# Patient Record
Sex: MALE | Race: WHITE | NOT HISPANIC OR LATINO | Employment: UNEMPLOYED | URBAN - METROPOLITAN AREA
[De-identification: names, ages, dates, MRNs, and addresses within clinical notes are randomized per-mention and may not be internally consistent; named-entity substitution may affect disease eponyms.]

---

## 2023-10-28 ENCOUNTER — HOSPITAL ENCOUNTER (EMERGENCY)
Facility: HOSPITAL | Age: 43
Discharge: HOME | End: 2023-10-29
Attending: INTERNAL MEDICINE
Payer: COMMERCIAL

## 2023-10-28 DIAGNOSIS — R45.1 AGITATION: ICD-10-CM

## 2023-10-28 DIAGNOSIS — F23 ACUTE PSYCHOSIS: ICD-10-CM

## 2023-10-28 DIAGNOSIS — F19.921: Primary | ICD-10-CM

## 2023-10-28 LAB
ALBUMIN SERPL-MCNC: 3.6 G/DL (ref 3.5–5)
ALBUMIN/GLOB SERPL: 1.2 RATIO (ref 1.1–2)
ALP SERPL-CCNC: 58 UNIT/L (ref 40–150)
ALT SERPL-CCNC: 14 UNIT/L (ref 0–55)
APAP SERPL-MCNC: <17.4 UG/ML (ref 17.4–30)
AST SERPL-CCNC: 21 UNIT/L (ref 5–34)
BASOPHILS # BLD AUTO: 0.01 X10(3)/MCL
BASOPHILS NFR BLD AUTO: 0.1 %
BILIRUB SERPL-MCNC: 0.2 MG/DL
BUN SERPL-MCNC: 15 MG/DL (ref 8.9–20.6)
CALCIUM SERPL-MCNC: 8.6 MG/DL (ref 8.4–10.2)
CHLORIDE SERPL-SCNC: 108 MMOL/L (ref 98–107)
CO2 SERPL-SCNC: 21 MMOL/L (ref 22–29)
CREAT SERPL-MCNC: 1.33 MG/DL (ref 0.73–1.18)
EOSINOPHIL # BLD AUTO: 0.17 X10(3)/MCL (ref 0–0.9)
EOSINOPHIL NFR BLD AUTO: 2.1 %
ERYTHROCYTE [DISTWIDTH] IN BLOOD BY AUTOMATED COUNT: 12.2 % (ref 11.5–17)
ETHANOL SERPL-MCNC: <10 MG/DL
GFR SERPLBLD CREATININE-BSD FMLA CKD-EPI: >60 MLS/MIN/1.73/M2
GLOBULIN SER-MCNC: 3 GM/DL (ref 2.4–3.5)
GLUCOSE SERPL-MCNC: 107 MG/DL (ref 74–100)
HCT VFR BLD AUTO: 43.2 % (ref 42–52)
HGB BLD-MCNC: 14.9 G/DL (ref 14–18)
IMM GRANULOCYTES # BLD AUTO: 0.02 X10(3)/MCL (ref 0–0.04)
IMM GRANULOCYTES NFR BLD AUTO: 0.2 %
LYMPHOCYTES # BLD AUTO: 1.74 X10(3)/MCL (ref 0.6–4.6)
LYMPHOCYTES NFR BLD AUTO: 21.7 %
MCH RBC QN AUTO: 32.9 PG (ref 27–31)
MCHC RBC AUTO-ENTMCNC: 34.5 G/DL (ref 33–36)
MCV RBC AUTO: 95.4 FL (ref 80–94)
MONOCYTES # BLD AUTO: 0.57 X10(3)/MCL (ref 0.1–1.3)
MONOCYTES NFR BLD AUTO: 7.1 %
NEUTROPHILS # BLD AUTO: 5.52 X10(3)/MCL (ref 2.1–9.2)
NEUTROPHILS NFR BLD AUTO: 68.8 %
PLATELET # BLD AUTO: 194 X10(3)/MCL (ref 130–400)
PMV BLD AUTO: 10.1 FL (ref 7.4–10.4)
POTASSIUM SERPL-SCNC: 3.9 MMOL/L (ref 3.5–5.1)
PROT SERPL-MCNC: 6.6 GM/DL (ref 6.4–8.3)
RBC # BLD AUTO: 4.53 X10(6)/MCL (ref 4.7–6.1)
SALICYLATES SERPL-MCNC: <5 MG/DL (ref 15–30)
SODIUM SERPL-SCNC: 142 MMOL/L (ref 136–145)
TSH SERPL-ACNC: 1.16 UIU/ML (ref 0.35–4.94)
WBC # SPEC AUTO: 8.03 X10(3)/MCL (ref 4.5–11.5)

## 2023-10-28 PROCEDURE — 85025 COMPLETE CBC W/AUTO DIFF WBC: CPT | Performed by: INTERNAL MEDICINE

## 2023-10-28 PROCEDURE — 96360 HYDRATION IV INFUSION INIT: CPT

## 2023-10-28 PROCEDURE — 84443 ASSAY THYROID STIM HORMONE: CPT | Performed by: INTERNAL MEDICINE

## 2023-10-28 PROCEDURE — 82077 ASSAY SPEC XCP UR&BREATH IA: CPT | Performed by: INTERNAL MEDICINE

## 2023-10-28 PROCEDURE — 25000003 PHARM REV CODE 250: Performed by: INTERNAL MEDICINE

## 2023-10-28 PROCEDURE — 99285 EMERGENCY DEPT VISIT HI MDM: CPT

## 2023-10-28 PROCEDURE — 80053 COMPREHEN METABOLIC PANEL: CPT | Performed by: INTERNAL MEDICINE

## 2023-10-28 PROCEDURE — 80143 DRUG ASSAY ACETAMINOPHEN: CPT | Performed by: INTERNAL MEDICINE

## 2023-10-28 PROCEDURE — 63600175 PHARM REV CODE 636 W HCPCS: Performed by: INTERNAL MEDICINE

## 2023-10-28 PROCEDURE — 96361 HYDRATE IV INFUSION ADD-ON: CPT

## 2023-10-28 PROCEDURE — 96372 THER/PROPH/DIAG INJ SC/IM: CPT | Performed by: INTERNAL MEDICINE

## 2023-10-28 PROCEDURE — 80179 DRUG ASSAY SALICYLATE: CPT | Performed by: INTERNAL MEDICINE

## 2023-10-28 RX ORDER — DIPHENHYDRAMINE HYDROCHLORIDE 50 MG/ML
50 INJECTION INTRAMUSCULAR; INTRAVENOUS
Status: COMPLETED | OUTPATIENT
Start: 2023-10-28 | End: 2023-10-28

## 2023-10-28 RX ORDER — LORAZEPAM 2 MG/ML
2 INJECTION INTRAMUSCULAR
Status: COMPLETED | OUTPATIENT
Start: 2023-10-28 | End: 2023-10-28

## 2023-10-28 RX ORDER — HALOPERIDOL 5 MG/ML
10 INJECTION INTRAMUSCULAR
Status: COMPLETED | OUTPATIENT
Start: 2023-10-28 | End: 2023-10-28

## 2023-10-28 RX ORDER — SODIUM CHLORIDE 9 MG/ML
1000 INJECTION, SOLUTION INTRAVENOUS CONTINUOUS
Status: ACTIVE | OUTPATIENT
Start: 2023-10-28 | End: 2023-10-28

## 2023-10-28 RX ADMIN — DIPHENHYDRAMINE HYDROCHLORIDE 50 MG: 50 INJECTION INTRAMUSCULAR; INTRAVENOUS at 08:10

## 2023-10-28 RX ADMIN — LORAZEPAM 2 MG: 2 INJECTION INTRAMUSCULAR; INTRAVENOUS at 08:10

## 2023-10-28 RX ADMIN — HALOPERIDOL LACTATE 10 MG: 5 INJECTION, SOLUTION INTRAMUSCULAR at 08:10

## 2023-10-28 RX ADMIN — SODIUM CHLORIDE 1000 ML: 9 INJECTION, SOLUTION INTRAVENOUS at 10:10

## 2023-10-28 RX ADMIN — SODIUM CHLORIDE 1000 ML: 9 INJECTION, SOLUTION INTRAVENOUS at 08:10

## 2023-10-29 VITALS
RESPIRATION RATE: 18 BRPM | SYSTOLIC BLOOD PRESSURE: 101 MMHG | DIASTOLIC BLOOD PRESSURE: 61 MMHG | OXYGEN SATURATION: 95 % | TEMPERATURE: 98 F | WEIGHT: 240 LBS | HEART RATE: 59 BPM

## 2023-10-29 PROCEDURE — 25000003 PHARM REV CODE 250: Performed by: INTERNAL MEDICINE

## 2023-10-29 RX ORDER — SODIUM CHLORIDE 9 MG/ML
1000 INJECTION, SOLUTION INTRAVENOUS CONTINUOUS
Status: ACTIVE | OUTPATIENT
Start: 2023-10-28 | End: 2023-10-29

## 2023-10-29 RX ADMIN — SODIUM CHLORIDE 1000 ML: 9 INJECTION, SOLUTION INTRAVENOUS at 12:10

## 2023-10-29 NOTE — ED PROVIDER NOTES
Encounter Date: 10/28/2023       History     Chief Complaint   Patient presents with    Psychiatric Evaluation     Per ems pt found on side of road with bizarre behavior. 400mcg ketamine given in route.      42-year-old white male brought in by EMS after being found on the side of road with bizarre behavior and being very combative.  He was given 400 mg of ketamine intramuscularly by EMS and despite this they still had to time down and he was screaming profanity.  Per EMS patient has a known history of synthetic marijuana use       Review of patient's allergies indicates:  No Known Allergies  No past medical history on file.  No past surgical history on file.  No family history on file.     Review of Systems   Unable to perform ROS: Mental status change       Physical Exam     Initial Vitals [10/28/23 2019]   BP Pulse Resp Temp SpO2   135/68 (!) 119 (!) 26 98.2 °F (36.8 °C) (!) 94 %      MAP       --         Physical Exam    Nursing note and vitals reviewed.  Constitutional: He appears well-developed and well-nourished. He is diaphoretic. He appears distressed.   Disheveled, dirty   HENT:   Head: Normocephalic and atraumatic.   Eyes: Conjunctivae and EOM are normal.   Neck: Neck supple.   Normal range of motion.  Cardiovascular:  Regular rhythm.           Tachycardia   Pulmonary/Chest: Breath sounds normal.   Tachypnea   Abdominal: Abdomen is soft. Bowel sounds are normal.   Musculoskeletal:         General: Normal range of motion.      Cervical back: Normal range of motion and neck supple.     Neurological: He is alert.   Skin: Skin is warm. Capillary refill takes less than 2 seconds.   Psychiatric: His mood appears anxious. His affect is angry and inappropriate. His speech is rapid and/or pressured and tangential. He is agitated, aggressive, hyperactive, actively hallucinating and combative. Thought content is paranoid and delusional. He expresses impulsivity. He is inattentive.         ED Course   Critical  Care    Date/Time: 10/29/2023 2:50 AM    Performed by: Farhat Paredes MD  Authorized by: Farhat Paredes MD  Direct patient critical care time: 36 minutes  Additional history critical care time: 12 minutes  Ordering / reviewing critical care time: 11 minutes  Documentation critical care time: 13 minutes  Consult with family critical care time: 7 minutes  Total critical care time (exclusive of procedural time) : 79 minutes  Critical care time was exclusive of separately billable procedures and treating other patients.  Critical care was necessary to treat or prevent imminent or life-threatening deterioration of the following conditions: toxidrome and CNS failure or compromise.  Critical care was time spent personally by me on the following activities: blood draw for specimens, development of treatment plan with patient or surrogate, interpretation of cardiac output measurements, evaluation of patient's response to treatment, examination of patient, obtaining history from patient or surrogate, ordering and performing treatments and interventions, ordering and review of laboratory studies, pulse oximetry, re-evaluation of patient's condition, review of old charts and vascular access procedures.        Admission on 10/28/2023   Component Date Value Ref Range Status    Sodium Level 10/28/2023 142  136 - 145 mmol/L Final    Potassium Level 10/28/2023 3.9  3.5 - 5.1 mmol/L Final    Chloride 10/28/2023 108 (H)  98 - 107 mmol/L Final    Carbon Dioxide 10/28/2023 21 (L)  22 - 29 mmol/L Final    Glucose Level 10/28/2023 107 (H)  74 - 100 mg/dL Final    Blood Urea Nitrogen 10/28/2023 15.0  8.9 - 20.6 mg/dL Final    Creatinine 10/28/2023 1.33 (H)  0.73 - 1.18 mg/dL Final    Calcium Level Total 10/28/2023 8.6  8.4 - 10.2 mg/dL Final    Protein Total 10/28/2023 6.6  6.4 - 8.3 gm/dL Final    Albumin Level 10/28/2023 3.6  3.5 - 5.0 g/dL Final    Globulin 10/28/2023 3.0  2.4 - 3.5 gm/dL Final    Albumin/Globulin Ratio  10/28/2023 1.2  1.1 - 2.0 ratio Final    Bilirubin Total 10/28/2023 0.2  <=1.5 mg/dL Final    Alkaline Phosphatase 10/28/2023 58  40 - 150 unit/L Final    Alanine Aminotransferase 10/28/2023 14  0 - 55 unit/L Final    Aspartate Aminotransferase 10/28/2023 21  5 - 34 unit/L Final    eGFR 10/28/2023 >60  mls/min/1.73/m2 Final    TSH 10/28/2023 1.157  0.350 - 4.940 uIU/mL Final    Ethanol Level 10/28/2023 <10.0  <=10.0 mg/dL Final    Acetaminophen Level 10/28/2023 <17.4 (L)  17.4 - 30.0 ug/ml Final    Salicylate Level 10/28/2023 <5.0 (L)  15.0 - 30.0 mg/dL Final    WBC 10/28/2023 8.03  4.50 - 11.50 x10(3)/mcL Final    RBC 10/28/2023 4.53 (L)  4.70 - 6.10 x10(6)/mcL Final    Hgb 10/28/2023 14.9  14.0 - 18.0 g/dL Final    Hct 10/28/2023 43.2  42.0 - 52.0 % Final    MCV 10/28/2023 95.4 (H)  80.0 - 94.0 fL Final    MCH 10/28/2023 32.9 (H)  27.0 - 31.0 pg Final    MCHC 10/28/2023 34.5  33.0 - 36.0 g/dL Final    RDW 10/28/2023 12.2  11.5 - 17.0 % Final    Platelet 10/28/2023 194  130 - 400 x10(3)/mcL Final    MPV 10/28/2023 10.1  7.4 - 10.4 fL Final    Neut % 10/28/2023 68.8  % Final    Lymph % 10/28/2023 21.7  % Final    Mono % 10/28/2023 7.1  % Final    Eos % 10/28/2023 2.1  % Final    Basophil % 10/28/2023 0.1  % Final    Lymph # 10/28/2023 1.74  0.6 - 4.6 x10(3)/mcL Final    Neut # 10/28/2023 5.52  2.1 - 9.2 x10(3)/mcL Final    Mono # 10/28/2023 0.57  0.1 - 1.3 x10(3)/mcL Final    Eos # 10/28/2023 0.17  0 - 0.9 x10(3)/mcL Final    Baso # 10/28/2023 0.01  <=0.2 x10(3)/mcL Final    IG# 10/28/2023 0.02  0 - 0.04 x10(3)/mcL Final    IG% 10/28/2023 0.2  % Final       Labs Reviewed   COMPREHENSIVE METABOLIC PANEL - Abnormal; Notable for the following components:       Result Value    Chloride 108 (*)     Carbon Dioxide 21 (*)     Glucose Level 107 (*)     Creatinine 1.33 (*)     All other components within normal limits   ACETAMINOPHEN LEVEL - Abnormal; Notable for the following components:    Acetaminophen Level <17.4 (*)      All other components within normal limits   SALICYLATE LEVEL - Abnormal; Notable for the following components:    Salicylate Level <5.0 (*)     All other components within normal limits   CBC WITH DIFFERENTIAL - Abnormal; Notable for the following components:    RBC 4.53 (*)     MCV 95.4 (*)     MCH 32.9 (*)     All other components within normal limits   TSH - Normal   ALCOHOL,MEDICAL (ETHANOL) - Normal   CBC W/ AUTO DIFFERENTIAL    Narrative:     The following orders were created for panel order CBC auto differential.  Procedure                               Abnormality         Status                     ---------                               -----------         ------                     CBC with Differential[[id number]]       Abnormal            Final result                 Please view results for these tests on the individual orders.   URINALYSIS, REFLEX TO URINE CULTURE   DRUG SCREEN, URINE (BEAKER)          Imaging Results    None          Medications   0.9%  NaCl infusion (0 mLs Intravenous Stopped 10/28/23 2230)   0.9%  NaCl infusion (1,000 mLs Intravenous New Bag 10/29/23 0025)   diphenhydrAMINE injection 50 mg (50 mg Intramuscular Given by Provider 10/28/23 2049)   haloperidol lactate injection 10 mg (10 mg Intramuscular Given by Provider 10/28/23 2050)   LORazepam injection 2 mg (2 mg Intramuscular Given by Provider 10/28/23 2051)     Medical Decision Making  42-year-old white male with acute psychosis and bizarre behavior.  Patient has a known history of synthetic marijuana use and was found the side road being combative with bizarre behavior.  EMS gave 400 mg of ketamine intramuscularly and brought into the emergency department for evaluation.  Once in the room patient continued lash out and became a danger to himself and the nursing staff therefore he was given a be 52 at that time and placed in stuff restraints.  Within 30 minutes the combination of the ketamine in the be 52 dramatically calm him  down in an IV was able to be started and at that time he was given 500 mL of saline Q hour to try to help silver him up.  Blood work was drawn and urinalysis was ordered however he never provide urine while he was here.  Blood work drawn showed no acute changes so we just continued with IV fluids.  After the 2 L of fluid his heart rate came down to the 60s in his blood pressure was down in the 1 100s over 70s.  He slept for approximately 6 hours and woke up wanting to know what happened was asked him to call his brother to come pick him up so he could go home.  He is awake alert and oriented denies suicidal homicidal ideation states he wants to go home    Problems Addressed:  Acute psychosis: acute illness or injury with systemic symptoms that poses a threat to life or bodily functions  Agitation: acute illness or injury  Synthetic cannabinoid-induced delirium: acute illness or injury with systemic symptoms that poses a threat to life or bodily functions    Amount and/or Complexity of Data Reviewed  External Data Reviewed: labs and notes.  Labs: ordered. Decision-making details documented in ED Course.    Risk  OTC drugs.  Prescription drug management.  Drug therapy requiring intensive monitoring for toxicity.  Diagnosis or treatment significantly limited by social determinants of health.               ED Course as of 10/29/23 0251   Sat Oct 28, 2023   2038 Despite patient being in 4 point soft restraints and getting 400 mg of ketamine intramuscular by EMS he is still thrashing around in the bed and is breaking through the restraints therefore I have ordered Benadryl Haldol and Ativan to be given intramuscularly and we will reapply the better restraints [PL]      ED Course User Index  [PL] Farhat Paredes MD                    Clinical Impression:   Final diagnoses:  [R45.1] Agitation  [F19.921] Synthetic cannabinoid-induced delirium (Primary)  [F23] Acute psychosis        ED Disposition Condition    Discharge  Stable          ED Prescriptions    None       Follow-up Information       Follow up With Specialties Details Why Contact Info    Primary care physician  In 3 days            Maria Luz Duron is a certified MA and was present during the entire interaction with this patient      Farhat Paredes MD  10/29/23 4909

## 2023-12-17 ENCOUNTER — HOSPITAL ENCOUNTER (EMERGENCY)
Facility: HOSPITAL | Age: 43
Discharge: PSYCHIATRIC OR SUBSTANCE USE FACILITY | End: 2023-12-18
Attending: GENERAL ACUTE CARE HOSPITAL
Payer: MEDICAID

## 2023-12-17 DIAGNOSIS — F29 PSYCHOSIS, UNSPECIFIED PSYCHOSIS TYPE: Primary | ICD-10-CM

## 2023-12-17 DIAGNOSIS — T40.725A: ICD-10-CM

## 2023-12-17 DIAGNOSIS — J11.1 INFLUENZA: ICD-10-CM

## 2023-12-17 DIAGNOSIS — J10.1 INFLUENZA A: ICD-10-CM

## 2023-12-17 LAB
ALBUMIN SERPL-MCNC: 3.8 G/DL (ref 3.5–5)
ALBUMIN/GLOB SERPL: 1.1 RATIO (ref 1.1–2)
ALP SERPL-CCNC: 57 UNIT/L (ref 40–150)
ALT SERPL-CCNC: 16 UNIT/L (ref 0–55)
AMPHET UR QL SCN: NEGATIVE
APAP SERPL-MCNC: <17.4 UG/ML (ref 17.4–30)
APPEARANCE UR: CLEAR
AST SERPL-CCNC: 20 UNIT/L (ref 5–34)
BACTERIA #/AREA URNS AUTO: ABNORMAL /HPF
BARBITURATE SCN PRESENT UR: NEGATIVE
BASOPHILS # BLD AUTO: 0.01 X10(3)/MCL
BASOPHILS NFR BLD AUTO: 0.2 %
BENZODIAZ UR QL SCN: NEGATIVE
BILIRUB SERPL-MCNC: 0.4 MG/DL
BILIRUB UR QL STRIP.AUTO: ABNORMAL
BUN SERPL-MCNC: 17 MG/DL (ref 8.9–20.6)
CALCIUM SERPL-MCNC: 9.2 MG/DL (ref 8.4–10.2)
CANNABINOIDS UR QL SCN: POSITIVE
CHLORIDE SERPL-SCNC: 104 MMOL/L (ref 98–107)
CO2 SERPL-SCNC: 24 MMOL/L (ref 22–29)
COCAINE UR QL SCN: NEGATIVE
COLOR UR AUTO: YELLOW
CREAT SERPL-MCNC: 1.25 MG/DL (ref 0.73–1.18)
EOSINOPHIL # BLD AUTO: 0.2 X10(3)/MCL (ref 0–0.9)
EOSINOPHIL NFR BLD AUTO: 3.2 %
ERYTHROCYTE [DISTWIDTH] IN BLOOD BY AUTOMATED COUNT: 11.7 % (ref 11.5–17)
ETHANOL SERPL-MCNC: <10 MG/DL
FENTANYL UR QL SCN: NEGATIVE
FLUAV AG UPPER RESP QL IA.RAPID: DETECTED
FLUBV AG UPPER RESP QL IA.RAPID: NOT DETECTED
GFR SERPLBLD CREATININE-BSD FMLA CKD-EPI: >60 MLS/MIN/1.73/M2
GLOBULIN SER-MCNC: 3.6 GM/DL (ref 2.4–3.5)
GLUCOSE SERPL-MCNC: 123 MG/DL (ref 74–100)
GLUCOSE UR QL STRIP.AUTO: NEGATIVE
HCT VFR BLD AUTO: 46.1 % (ref 42–52)
HGB BLD-MCNC: 15.8 G/DL (ref 14–18)
IMM GRANULOCYTES # BLD AUTO: 0.01 X10(3)/MCL (ref 0–0.04)
IMM GRANULOCYTES NFR BLD AUTO: 0.2 %
KETONES UR QL STRIP.AUTO: ABNORMAL
LEUKOCYTE ESTERASE UR QL STRIP.AUTO: NEGATIVE
LYMPHOCYTES # BLD AUTO: 2.62 X10(3)/MCL (ref 0.6–4.6)
LYMPHOCYTES NFR BLD AUTO: 41.5 %
MCH RBC QN AUTO: 32 PG (ref 27–31)
MCHC RBC AUTO-ENTMCNC: 34.3 G/DL (ref 33–36)
MCV RBC AUTO: 93.5 FL (ref 80–94)
MDMA UR QL SCN: NEGATIVE
MONOCYTES # BLD AUTO: 0.27 X10(3)/MCL (ref 0.1–1.3)
MONOCYTES NFR BLD AUTO: 4.3 %
MUCOUS THREADS URNS QL MICRO: ABNORMAL /LPF
NEUTROPHILS # BLD AUTO: 3.2 X10(3)/MCL (ref 2.1–9.2)
NEUTROPHILS NFR BLD AUTO: 50.6 %
NITRITE UR QL STRIP.AUTO: NEGATIVE
OPIATES UR QL SCN: NEGATIVE
PCP UR QL: NEGATIVE
PH UR STRIP.AUTO: 6 [PH]
PH UR: 6 [PH] (ref 3–11)
PLATELET # BLD AUTO: 206 X10(3)/MCL (ref 130–400)
PMV BLD AUTO: 10.1 FL (ref 7.4–10.4)
POTASSIUM SERPL-SCNC: 4.1 MMOL/L (ref 3.5–5.1)
PROT SERPL-MCNC: 7.4 GM/DL (ref 6.4–8.3)
PROT UR QL STRIP.AUTO: ABNORMAL
RBC # BLD AUTO: 4.93 X10(6)/MCL (ref 4.7–6.1)
RBC #/AREA URNS AUTO: ABNORMAL /HPF
RBC UR QL AUTO: NEGATIVE
RSV A 5' UTR RNA NPH QL NAA+PROBE: NOT DETECTED
SALICYLATES SERPL-MCNC: <5 MG/DL (ref 15–30)
SARS-COV-2 RNA RESP QL NAA+PROBE: NOT DETECTED
SODIUM SERPL-SCNC: 140 MMOL/L (ref 136–145)
SP GR UR STRIP.AUTO: 1.02 (ref 1–1.03)
SPECIFIC GRAVITY, URINE AUTO (.000) (OHS): 1.02 (ref 1–1.03)
SQUAMOUS #/AREA URNS AUTO: ABNORMAL /HPF
TSH SERPL-ACNC: 1.08 UIU/ML (ref 0.35–4.94)
UROBILINOGEN UR STRIP-ACNC: 0.2
WBC # SPEC AUTO: 6.31 X10(3)/MCL (ref 4.5–11.5)
WBC #/AREA URNS AUTO: ABNORMAL /HPF

## 2023-12-17 PROCEDURE — 99285 EMERGENCY DEPT VISIT HI MDM: CPT

## 2023-12-17 PROCEDURE — 80307 DRUG TEST PRSMV CHEM ANLYZR: CPT | Performed by: GENERAL ACUTE CARE HOSPITAL

## 2023-12-17 PROCEDURE — 82077 ASSAY SPEC XCP UR&BREATH IA: CPT | Performed by: GENERAL ACUTE CARE HOSPITAL

## 2023-12-17 PROCEDURE — 80053 COMPREHEN METABOLIC PANEL: CPT | Performed by: GENERAL ACUTE CARE HOSPITAL

## 2023-12-17 PROCEDURE — 84443 ASSAY THYROID STIM HORMONE: CPT | Performed by: GENERAL ACUTE CARE HOSPITAL

## 2023-12-17 PROCEDURE — 0241U COVID/RSV/FLU A&B PCR: CPT | Performed by: GENERAL ACUTE CARE HOSPITAL

## 2023-12-17 PROCEDURE — 81001 URINALYSIS AUTO W/SCOPE: CPT | Performed by: GENERAL ACUTE CARE HOSPITAL

## 2023-12-17 PROCEDURE — 96372 THER/PROPH/DIAG INJ SC/IM: CPT | Performed by: GENERAL ACUTE CARE HOSPITAL

## 2023-12-17 PROCEDURE — 87428 SARSCOV & INF VIR A&B AG IA: CPT | Performed by: GENERAL ACUTE CARE HOSPITAL

## 2023-12-17 PROCEDURE — 80179 DRUG ASSAY SALICYLATE: CPT | Performed by: GENERAL ACUTE CARE HOSPITAL

## 2023-12-17 PROCEDURE — 80143 DRUG ASSAY ACETAMINOPHEN: CPT | Performed by: GENERAL ACUTE CARE HOSPITAL

## 2023-12-17 PROCEDURE — 63600175 PHARM REV CODE 636 W HCPCS: Performed by: GENERAL ACUTE CARE HOSPITAL

## 2023-12-17 PROCEDURE — 85025 COMPLETE CBC W/AUTO DIFF WBC: CPT | Performed by: GENERAL ACUTE CARE HOSPITAL

## 2023-12-17 RX ORDER — DIPHENHYDRAMINE HYDROCHLORIDE 50 MG/ML
25 INJECTION INTRAMUSCULAR; INTRAVENOUS
Status: COMPLETED | OUTPATIENT
Start: 2023-12-17 | End: 2023-12-17

## 2023-12-17 RX ORDER — HALOPERIDOL 5 MG/ML
5 INJECTION INTRAMUSCULAR
Status: COMPLETED | OUTPATIENT
Start: 2023-12-17 | End: 2023-12-17

## 2023-12-17 RX ORDER — LORAZEPAM 2 MG/ML
2 INJECTION INTRAMUSCULAR
Status: COMPLETED | OUTPATIENT
Start: 2023-12-17 | End: 2023-12-17

## 2023-12-17 RX ADMIN — DIPHENHYDRAMINE HYDROCHLORIDE 25 MG: 50 INJECTION, SOLUTION INTRAMUSCULAR; INTRAVENOUS at 06:12

## 2023-12-17 RX ADMIN — HALOPERIDOL LACTATE 5 MG: 5 INJECTION, SOLUTION INTRAMUSCULAR at 06:12

## 2023-12-17 RX ADMIN — LORAZEPAM 2 MG: 2 INJECTION, SOLUTION INTRAMUSCULAR; INTRAVENOUS at 06:12

## 2023-12-18 VITALS
TEMPERATURE: 97 F | WEIGHT: 225 LBS | OXYGEN SATURATION: 99 % | BODY MASS INDEX: 33.33 KG/M2 | HEIGHT: 69 IN | HEART RATE: 60 BPM | SYSTOLIC BLOOD PRESSURE: 109 MMHG | RESPIRATION RATE: 18 BRPM | DIASTOLIC BLOOD PRESSURE: 66 MMHG

## 2023-12-18 PROCEDURE — 63600175 PHARM REV CODE 636 W HCPCS

## 2023-12-18 RX ORDER — ZIPRASIDONE MESYLATE 20 MG/ML
INJECTION, POWDER, LYOPHILIZED, FOR SOLUTION INTRAMUSCULAR
Status: COMPLETED
Start: 2023-12-18 | End: 2023-12-18

## 2023-12-18 RX ORDER — ZIPRASIDONE MESYLATE 20 MG/ML
20 INJECTION, POWDER, LYOPHILIZED, FOR SOLUTION INTRAMUSCULAR
Status: DISCONTINUED | OUTPATIENT
Start: 2023-12-18 | End: 2023-12-18 | Stop reason: HOSPADM

## 2023-12-18 RX ADMIN — ZIPRASIDONE MESYLATE 20 MG: 20 INJECTION, POWDER, LYOPHILIZED, FOR SOLUTION INTRAMUSCULAR at 07:12

## 2023-12-18 NOTE — ED PROVIDER NOTES
Encounter Date: 12/17/2023       History     Chief Complaint   Patient presents with    Altered Mental Status     BIBA found unresponsive in the road by police   EMS reports large pupils and decreased respirations   Pt arrives awake angry and fully alert    shouting nonsense angrilly   EMS gave O2 only     The patient was brought by EMS because he was found unresponsive on road side, he is known to be homeless, drug abuser, has h/o bipolar disorder, by EMS report a police found him on a road side and planned to give nasal narcan, when EMS truck pulled in and after paramedic evaluation he found to have BL pupils dilatation, no narcan was give, he got alert, vocal on a process of transferring him on a EMS stretcher, he is GCS 15 on ER arrival    The history is provided by the EMS personnel and the police. The history is limited by the condition of the patient.     Review of patient's allergies indicates:  No Known Allergies  No past medical history on file.  No past surgical history on file.  No family history on file.  Social History     Tobacco Use    Smoking status: Unknown   Substance Use Topics    Alcohol use: Yes    Drug use: Yes     Review of Systems   Unable to perform ROS: Acuity of condition   Psychiatric/Behavioral:  Positive for agitation, behavioral problems, confusion and decreased concentration. The patient is nervous/anxious and is hyperactive.        Physical Exam     Initial Vitals [12/17/23 1832]   BP Pulse Resp Temp SpO2   (!) 150/82 (!) 114 18 -- 100 %      MAP       --         Physical Exam    Nursing note and vitals reviewed.  Constitutional: He appears well-developed and well-nourished.   Disheveled, dirty   HENT:   Right Ear: External ear normal.   Left Ear: External ear normal.   Eyes: Conjunctivae are normal. Pupils are equal, round, and reactive to light.   Neck:   Normal range of motion.  Cardiovascular:    Tachycardia present.         Pulmonary/Chest: Breath sounds normal.   Abdominal:  Abdomen is soft.   Musculoskeletal:         General: Normal range of motion.      Cervical back: Normal range of motion.     Neurological: He is alert and oriented to person, place, and time.   Skin: Skin is warm. Capillary refill takes 2 to 3 seconds.   Psychiatric: His mood appears anxious. His speech is rapid and/or pressured. He is agitated, aggressive and hyperactive. Thought content is delusional. Cognition and memory are impaired. He expresses inappropriate judgment.         ED Course   Critical Care    Date/Time: 12/17/2023 9:29 PM    Performed by: Mira Chaudhry MD  Authorized by: Mira Chaudhry MD  Direct patient critical care time: 10 minutes  Additional history critical care time: 10 minutes  Documentation critical care time: 10 minutes  Total critical care time (exclusive of procedural time) : 30 minutes  Critical care was time spent personally by me on the following activities: ordering and review of laboratory studies and pulse oximetry.  Comments: Patient requires chemical restraint        Labs Reviewed   COMPREHENSIVE METABOLIC PANEL - Abnormal; Notable for the following components:       Result Value    Glucose Level 123 (*)     Creatinine 1.25 (*)     Globulin 3.6 (*)     All other components within normal limits   URINALYSIS, REFLEX TO URINE CULTURE - Abnormal; Notable for the following components:    Protein, UA 1+ (*)     Ketones, UA Trace (*)     Bilirubin, UA 1+ (*)     All other components within normal limits   DRUG SCREEN, URINE (BEAKER) - Abnormal; Notable for the following components:    Cannabinoids, Urine Positive (*)     All other components within normal limits    Narrative:     Cut off concentrations:    Amphetamines - 1000 ng/ml  Barbiturates - 200 ng/ml  Benzodiazepine - 200 ng/ml  Cannabinoids (THC) - 50 ng/ml  Cocaine - 300 ng/ml  Fentanyl - 1.0 ng/ml  MDMA - 500 ng/ml  Opiates - 300 ng/ml   Phencyclidine (PCP) - 25 ng/ml    Specimen submitted for drug analysis and  tested for pH and specific gravity in order to evaluate sample integrity. Suspect tampering if specific gravity is <1.003 and/or pH is not within the range of 4.5 - 8.0  False negatives may result form substances such as bleach added to urine.  False positives may result for the presence of a substance with similar chemical structure to the drug or its metabolite.    This test provides only a PRELIMINARY analytical test result. A more specific alternate chemical method must be used in order to obtain a confirmed analytical result. Gas chromatography/mass spectrometry (GC/MS) is the preferred confirmatory method. Other chemical confirmation methods are available. Clinical consideration and professional judgement should be applied to any drug of abuse test result, particularly when preliminary positive results are used.    Positive results will be confirmed only at the physicians request. Unconfirmed screening results are to be used only for medical purposes (treatment).        ACETAMINOPHEN LEVEL - Abnormal; Notable for the following components:    Acetaminophen Level <17.4 (*)     All other components within normal limits   SALICYLATE LEVEL - Abnormal; Notable for the following components:    Salicylate Level <5.0 (*)     All other components within normal limits   CBC WITH DIFFERENTIAL - Abnormal; Notable for the following components:    MCH 32.0 (*)     All other components within normal limits   COVID/RSV/FLU A&B PCR - Abnormal; Notable for the following components:    Influenza A PCR Detected (*)     All other components within normal limits    Narrative:     The Xpert Xpress SARS-CoV-2/FLU/RSV plus is a rapid, multiplexed real-time PCR test intended for the simultaneous qualitative detection and differentiation of SARS-CoV-2, Influenza A, Influenza B, and respiratory syncytial virus (RSV) viral RNA in either nasopharyngeal swab or nasal swab specimens.         URINALYSIS, MICROSCOPIC - Abnormal; Notable for the  following components:    Mucous, UA Small (*)     WBC, UA 6-10 (*)     Squamous Epithelial Cells, UA Few (*)     All other components within normal limits   TSH - Normal   ALCOHOL,MEDICAL (ETHANOL) - Normal   CBC W/ AUTO DIFFERENTIAL    Narrative:     The following orders were created for panel order CBC auto differential.  Procedure                               Abnormality         Status                     ---------                               -----------         ------                     CBC with Differential[[id number]]       Abnormal            Final result                 Please view results for these tests on the individual orders.          Imaging Results    None          Medications   haloperidol lactate injection 5 mg (5 mg Intramuscular Given 12/17/23 1847)   LORazepam injection 2 mg (2 mg Intramuscular Given 12/17/23 1847)   diphenhydrAMINE injection 25 mg (25 mg Intramuscular Given 12/17/23 1846)     Medical Decision Making  The patient was brought by EMS because he was found unresponsive on road side, he is known to be homeless, drug abuser, has h/o bipolar disorder, by EMS report a police found him on a road side and planned to give nasal narcan, when EMS truck pulled in and after paramedic evaluation he found to have BL pupils dilatation, no narcan was give, he got alert, vocal on a process of transferring him on a EMS stretcher, he is GCS 15 on ER arrival    PE revealed agitated, verbal, belligerent male, delusional, screaming, agitated, doesn't allow to touch him, he tried to leave ER, throwing things in ER, but because he has no place to go and could not be released from ER, he got B52 injection and PECed    Amount and/or Complexity of Data Reviewed  Labs: ordered.  Discussion of management or test interpretation with external provider(s): Diff diagnosis: psychosis, bipolar, drug abuse    Risk  Prescription drug management.               ED Course as of 12/17/23 2131   Sun Dec 17, 2023   2049  Patient is positive for influenza A, [IP]   2129 UDS is positive for THC [IP]      ED Course User Index  [IP] Mira Chaudhry MD       Medically cleared for psychiatry placement: 12/17/2023  9:30 PM                   Clinical Impression:  Final diagnoses:  [F29] Psychosis, unspecified psychosis type (Primary)  [J10.1] Influenza A  [T40.725A] Adverse effect of synthetic cannabinoids, initial encounter          ED Disposition Condition    Transfer to Psych Facility Stable          ED Prescriptions    None       Follow-up Information    None          Mira Chaudhry MD  12/17/23 3508

## 2024-06-16 ENCOUNTER — HOSPITAL ENCOUNTER (EMERGENCY)
Facility: HOSPITAL | Age: 44
Discharge: PSYCHIATRIC OR SUBSTANCE USE FACILITY | End: 2024-06-17
Attending: STUDENT IN AN ORGANIZED HEALTH CARE EDUCATION/TRAINING PROGRAM
Payer: MEDICAID

## 2024-06-16 DIAGNOSIS — F23 ACUTE PSYCHOSIS: Primary | ICD-10-CM

## 2024-06-16 LAB
ALBUMIN SERPL-MCNC: 3.9 G/DL (ref 3.5–5)
ALBUMIN/GLOB SERPL: 1.3 RATIO (ref 1.1–2)
ALP SERPL-CCNC: 54 UNIT/L (ref 40–150)
ALT SERPL-CCNC: 16 UNIT/L (ref 0–55)
AMORPH URATE CRY URNS QL MICRO: ABNORMAL /HPF
AMPHET UR QL SCN: NEGATIVE
ANION GAP SERPL CALC-SCNC: 14 MEQ/L
APAP SERPL-MCNC: <17.4 UG/ML (ref 10–30)
AST SERPL-CCNC: 14 UNIT/L (ref 5–34)
BACTERIA #/AREA URNS AUTO: ABNORMAL /HPF
BARBITURATE SCN PRESENT UR: NEGATIVE
BASOPHILS # BLD AUTO: 0.02 X10(3)/MCL
BASOPHILS NFR BLD AUTO: 0.3 %
BENZODIAZ UR QL SCN: POSITIVE
BILIRUB SERPL-MCNC: 0.3 MG/DL
BILIRUB UR QL STRIP.AUTO: NEGATIVE
BUN SERPL-MCNC: 16 MG/DL (ref 8.9–20.6)
CALCIUM SERPL-MCNC: 9.3 MG/DL (ref 8.4–10.2)
CANNABINOIDS UR QL SCN: NEGATIVE
CHLORIDE SERPL-SCNC: 107 MMOL/L (ref 98–107)
CLARITY UR: CLEAR
CO2 SERPL-SCNC: 19 MMOL/L (ref 22–29)
COCAINE UR QL SCN: NEGATIVE
COLOR UR AUTO: YELLOW
CREAT SERPL-MCNC: 1.13 MG/DL (ref 0.73–1.18)
CREAT/UREA NIT SERPL: 14
EOSINOPHIL # BLD AUTO: 0.17 X10(3)/MCL (ref 0–0.9)
EOSINOPHIL NFR BLD AUTO: 2.9 %
ERYTHROCYTE [DISTWIDTH] IN BLOOD BY AUTOMATED COUNT: 12.3 % (ref 11.5–17)
ETHANOL SERPL-MCNC: <10 MG/DL
FENTANYL UR QL SCN: NEGATIVE
GFR SERPLBLD CREATININE-BSD FMLA CKD-EPI: >60 ML/MIN/1.73/M2
GLOBULIN SER-MCNC: 3.1 GM/DL (ref 2.4–3.5)
GLUCOSE SERPL-MCNC: 108 MG/DL (ref 74–100)
GLUCOSE UR QL STRIP: NEGATIVE
HCT VFR BLD AUTO: 40.9 % (ref 42–52)
HGB BLD-MCNC: 14 G/DL (ref 14–18)
HGB UR QL STRIP: NEGATIVE
IMM GRANULOCYTES # BLD AUTO: 0.03 X10(3)/MCL (ref 0–0.04)
IMM GRANULOCYTES NFR BLD AUTO: 0.5 %
KETONES UR QL STRIP: NEGATIVE
LEUKOCYTE ESTERASE UR QL STRIP: NEGATIVE
LYMPHOCYTES # BLD AUTO: 2.5 X10(3)/MCL (ref 0.6–4.6)
LYMPHOCYTES NFR BLD AUTO: 42.2 %
MCH RBC QN AUTO: 32.2 PG (ref 27–31)
MCHC RBC AUTO-ENTMCNC: 34.2 G/DL (ref 33–36)
MCV RBC AUTO: 94 FL (ref 80–94)
MDMA UR QL SCN: NEGATIVE
MONOCYTES # BLD AUTO: 0.36 X10(3)/MCL (ref 0.1–1.3)
MONOCYTES NFR BLD AUTO: 6.1 %
NEUTROPHILS # BLD AUTO: 2.85 X10(3)/MCL (ref 2.1–9.2)
NEUTROPHILS NFR BLD AUTO: 48 %
NITRITE UR QL STRIP: NEGATIVE
OPIATES UR QL SCN: NEGATIVE
PCP UR QL: NEGATIVE
PH UR STRIP: 5.5 [PH]
PH UR: 5.5 [PH] (ref 3–11)
PLATELET # BLD AUTO: 185 X10(3)/MCL (ref 130–400)
PMV BLD AUTO: 10 FL (ref 7.4–10.4)
POTASSIUM SERPL-SCNC: 3.9 MMOL/L (ref 3.5–5.1)
PROT SERPL-MCNC: 7 GM/DL (ref 6.4–8.3)
PROT UR QL STRIP: ABNORMAL
RBC # BLD AUTO: 4.35 X10(6)/MCL (ref 4.7–6.1)
RBC #/AREA URNS AUTO: ABNORMAL /HPF
SODIUM SERPL-SCNC: 140 MMOL/L (ref 136–145)
SP GR UR STRIP.AUTO: 1.01 (ref 1–1.03)
SPECIFIC GRAVITY, URINE AUTO (.000) (OHS): 1.01 (ref 1–1.03)
SQUAMOUS #/AREA URNS AUTO: ABNORMAL /HPF
TSH SERPL-ACNC: 1.4 UIU/ML (ref 0.35–4.94)
UROBILINOGEN UR STRIP-ACNC: 0.2
WBC # BLD AUTO: 5.93 X10(3)/MCL (ref 4.5–11.5)
WBC #/AREA URNS AUTO: ABNORMAL /HPF

## 2024-06-16 PROCEDURE — 63600175 PHARM REV CODE 636 W HCPCS: Performed by: STUDENT IN AN ORGANIZED HEALTH CARE EDUCATION/TRAINING PROGRAM

## 2024-06-16 PROCEDURE — 85025 COMPLETE CBC W/AUTO DIFF WBC: CPT | Performed by: STUDENT IN AN ORGANIZED HEALTH CARE EDUCATION/TRAINING PROGRAM

## 2024-06-16 PROCEDURE — 84443 ASSAY THYROID STIM HORMONE: CPT | Performed by: STUDENT IN AN ORGANIZED HEALTH CARE EDUCATION/TRAINING PROGRAM

## 2024-06-16 PROCEDURE — 80143 DRUG ASSAY ACETAMINOPHEN: CPT | Performed by: STUDENT IN AN ORGANIZED HEALTH CARE EDUCATION/TRAINING PROGRAM

## 2024-06-16 PROCEDURE — 80307 DRUG TEST PRSMV CHEM ANLYZR: CPT | Performed by: STUDENT IN AN ORGANIZED HEALTH CARE EDUCATION/TRAINING PROGRAM

## 2024-06-16 PROCEDURE — 96372 THER/PROPH/DIAG INJ SC/IM: CPT | Performed by: STUDENT IN AN ORGANIZED HEALTH CARE EDUCATION/TRAINING PROGRAM

## 2024-06-16 PROCEDURE — 81003 URINALYSIS AUTO W/O SCOPE: CPT | Performed by: STUDENT IN AN ORGANIZED HEALTH CARE EDUCATION/TRAINING PROGRAM

## 2024-06-16 PROCEDURE — 99285 EMERGENCY DEPT VISIT HI MDM: CPT | Mod: 25

## 2024-06-16 PROCEDURE — 82077 ASSAY SPEC XCP UR&BREATH IA: CPT | Performed by: STUDENT IN AN ORGANIZED HEALTH CARE EDUCATION/TRAINING PROGRAM

## 2024-06-16 PROCEDURE — 96360 HYDRATION IV INFUSION INIT: CPT

## 2024-06-16 PROCEDURE — 80053 COMPREHEN METABOLIC PANEL: CPT | Performed by: STUDENT IN AN ORGANIZED HEALTH CARE EDUCATION/TRAINING PROGRAM

## 2024-06-16 RX ORDER — DIPHENHYDRAMINE HYDROCHLORIDE 50 MG/ML
INJECTION INTRAMUSCULAR; INTRAVENOUS
Status: COMPLETED
Start: 2024-06-16 | End: 2024-06-16

## 2024-06-16 RX ORDER — HALOPERIDOL 5 MG/ML
INJECTION INTRAMUSCULAR
Status: COMPLETED
Start: 2024-06-16 | End: 2024-06-16

## 2024-06-16 RX ORDER — HALOPERIDOL 5 MG/ML
5 INJECTION INTRAMUSCULAR
Status: COMPLETED | OUTPATIENT
Start: 2024-06-16 | End: 2024-06-16

## 2024-06-16 RX ORDER — DIPHENHYDRAMINE HYDROCHLORIDE 50 MG/ML
25 INJECTION INTRAMUSCULAR; INTRAVENOUS
Status: COMPLETED | OUTPATIENT
Start: 2024-06-16 | End: 2024-06-16

## 2024-06-16 RX ADMIN — SODIUM CHLORIDE, POTASSIUM CHLORIDE, SODIUM LACTATE AND CALCIUM CHLORIDE 1000 ML: 600; 310; 30; 20 INJECTION, SOLUTION INTRAVENOUS at 09:06

## 2024-06-16 RX ADMIN — HALOPERIDOL LACTATE 5 MG: 5 INJECTION, SOLUTION INTRAMUSCULAR at 08:06

## 2024-06-16 RX ADMIN — DIPHENHYDRAMINE HYDROCHLORIDE 25 MG: 50 INJECTION INTRAMUSCULAR; INTRAVENOUS at 08:06

## 2024-06-17 VITALS
BODY MASS INDEX: 32.96 KG/M2 | OXYGEN SATURATION: 97 % | DIASTOLIC BLOOD PRESSURE: 85 MMHG | TEMPERATURE: 99 F | HEART RATE: 78 BPM | SYSTOLIC BLOOD PRESSURE: 138 MMHG | RESPIRATION RATE: 20 BRPM | HEIGHT: 67 IN | WEIGHT: 210 LBS

## 2024-06-17 NOTE — ED PROVIDER NOTES
Encounter Date: 6/16/2024       History     Chief Complaint   Patient presents with    Drug Overdose     Pt brought in by [facility] with c/o of erratic behavior after smoking some spice.     HPI    43-year-old male with a known history substance abuse presents emergency department after patient supposedly smoked spice.  He was extremely combative on scene requiring multiple police and paramedics to restrain him.  Ended up receiving 5 mg of IM Versed.  He continued to act belligerent and combative and was given an additional 5 mg of IV Versed for a total of 10.  Patient is moderately sedated slurring his words at the present time.  He was moving all extremities equally.  He is able to hold a conversation and even though he seems sedated he is alert and oriented.  States that he smoked something today.    Review of patient's allergies indicates:  No Known Allergies  History reviewed. No pertinent past medical history.  History reviewed. No pertinent surgical history.  No family history on file.  Social History     Tobacco Use    Smoking status: Unknown   Substance Use Topics    Alcohol use: Yes    Drug use: Yes     Review of Systems   Unable to perform ROS: Psychiatric disorder   Constitutional:  Negative for fever.   Respiratory:  Negative for shortness of breath.    Cardiovascular:  Negative for chest pain.   Gastrointestinal:  Negative for abdominal pain.   Skin:  Negative for rash.   All other systems reviewed and are negative.      Physical Exam     Initial Vitals   BP Pulse Resp Temp SpO2   06/16/24 2134 06/16/24 1959 06/16/24 1959 06/16/24 1959 06/16/24 1959   (!) 100/57 102 20 98.7 °F (37.1 °C) 98 %      MAP       --                Physical Exam    Nursing note and vitals reviewed.  Constitutional: He appears well-developed and well-nourished. No distress.   Cardiovascular:  Normal rate and regular rhythm.           Pulmonary/Chest: Breath sounds normal. No respiratory distress.   Abdominal: Abdomen is soft. There  is no abdominal tenderness.   Musculoskeletal:         General: No tenderness. Normal range of motion.     Neurological: He is alert and oriented to person, place, and time. No cranial nerve deficit. GCS score is 15. GCS eye subscore is 4. GCS verbal subscore is 5. GCS motor subscore is 6.   Seems to be under the effect of Versed as expected.  Still alert and oriented.   Skin: Skin is warm. Capillary refill takes less than 2 seconds.   Psychiatric: He is agitated and hyperactive. Thought content is paranoid. He expresses no homicidal and no suicidal ideation. He expresses no suicidal plans and no homicidal plans. He is inattentive.         ED Course   Critical Care    Date/Time: 6/17/2024 12:30 AM    Performed by: Steven Fierro MD  Authorized by: Steven Fierro MD  Direct patient critical care time: 45 minutes  Total critical care time (exclusive of procedural time) : 45 minutes  Critical care time was exclusive of separately billable procedures and treating other patients.  Critical care was necessary to treat or prevent imminent or life-threatening deterioration of the following conditions: Psychiatric condition requiring IM injections of medication and monitoring.  Critical care was time spent personally by me on the following activities: development of treatment plan with patient or surrogate, interpretation of cardiac output measurements, evaluation of patient's response to treatment, examination of patient, obtaining history from patient or surrogate, ordering and performing treatments and interventions, ordering and review of laboratory studies, pulse oximetry, re-evaluation of patient's condition and review of old charts.        Labs Reviewed   COMPREHENSIVE METABOLIC PANEL - Abnormal; Notable for the following components:       Result Value    CO2 19 (*)     Glucose 108 (*)     All other components within normal limits   URINALYSIS, REFLEX TO URINE CULTURE - Abnormal; Notable for the following  components:    Protein, UA Trace (*)     All other components within normal limits   DRUG SCREEN, URINE (BEAKER) - Abnormal; Notable for the following components:    Benzodiazepine, Urine Positive (*)     All other components within normal limits    Narrative:     Cut off concentrations:    Amphetamines - 1000 ng/ml  Barbiturates - 200 ng/ml  Benzodiazepine - 200 ng/ml  Cannabinoids (THC) - 50 ng/ml  Cocaine - 300 ng/ml  Fentanyl - 1.0 ng/ml  MDMA - 500 ng/ml  Opiates - 300 ng/ml   Phencyclidine (PCP) - 25 ng/ml    Specimen submitted for drug analysis and tested for pH and specific gravity in order to evaluate sample integrity. Suspect tampering if specific gravity is <1.003 and/or pH is not within the range of 4.5 - 8.0  False negatives may result form substances such as bleach added to urine.  False positives may result for the presence of a substance with similar chemical structure to the drug or its metabolite.    This test provides only a PRELIMINARY analytical test result. A more specific alternate chemical method must be used in order to obtain a confirmed analytical result. Gas chromatography/mass spectrometry (GC/MS) is the preferred confirmatory method. Other chemical confirmation methods are available. Clinical consideration and professional judgement should be applied to any drug of abuse test result, particularly when preliminary positive results are used.    Positive results will be confirmed only at the physicians request. Unconfirmed screening results are to be used only for medical purposes (treatment).        CBC WITH DIFFERENTIAL - Abnormal; Notable for the following components:    RBC 4.35 (*)     Hct 40.9 (*)     MCH 32.2 (*)     All other components within normal limits   URINALYSIS, MICROSCOPIC - Abnormal; Notable for the following components:    Amporphous Urate Crystals, UA Rare (*)     All other components within normal limits   TSH - Normal   ALCOHOL,MEDICAL (ETHANOL) - Normal    ACETAMINOPHEN LEVEL - Normal   CBC W/ AUTO DIFFERENTIAL    Narrative:     The following orders were created for panel order CBC auto differential.  Procedure                               Abnormality         Status                     ---------                               -----------         ------                     CBC with Differential[[id number]]       Abnormal            Final result                 Please view results for these tests on the individual orders.          Imaging Results    None          Medications   lactated ringers bolus 1,000 mL (0 mLs Intravenous Stopped 6/16/24 2233)   haloperidol lactate injection 5 mg (0 mg Intramuscular Override Pull 6/16/24 2045)   diphenhydrAMINE injection 25 mg (0 mg Intramuscular Override Pull 6/16/24 2045)     Medical Decision Making  Initial Assessment:   Substance abuse    Differential Diagnosis:   Judging by the patient's chief complaint and pertinent history, the patient has the following possible differential diagnoses, including but not limited to the following.  Some of these are deemed to be lower likelihood and some more likely based on my physical exam and history combined with possible lab work and/or imaging studies.   Please see the pertinent studies, and refer to the HPI.  Some of these diagnoses will take further evaluation to fully rule out, perhaps as an outpatient and the patient was encouraged to follow up when discharged for more comprehensive evaluation.  Substance abuse, psychiatric disorder, dehydration, electrolyte abnormality,  as well as multiple other possible etiologies      Problems Addressed:  Acute psychosis: acute illness or injury    Amount and/or Complexity of Data Reviewed  External Data Reviewed: labs and notes.  Labs: ordered. Decision-making details documented in ED Course.    Risk  Prescription drug management.  Diagnosis or treatment significantly limited by social determinants of health.               ED Course as of  06/17/24 0031   Sun Jun 16, 2024 2019 Patient becoming extremely aggressive.  Will give him Benadryl and Haldol.  Will also place her on restraints [BS]   2024 Patient required the entire ER staff as well as to EMS providers and security to hold him down.  At this time it was decided that he needs to be further sedated went Benadryl and Haldol.  I know he received a lot of Versed, 10 mg but he is still extremely violent.  He was in restraints now.  He was on the cardiac monitor and pulse ox. [BS]   2207 Sodium: 140 [BS]   2207 Chloride: 107 [BS]   2207 CO2(!): 19 [BS]   2207 Glucose(!): 108 [BS]   2207 BUN: 16.0 [BS]   2207 Creatinine: 1.13 [BS]   2207 WBC: 5.93 [BS]   2207 Hematocrit(!): 40.9 [BS]   2207 Platelet Count: 185 [BS]   2207 Leukocyte Esterase, UA: Negative [BS]   2207 NITRITE UA: Negative [BS]   2241 Patient calm.  Will remove restraints [BS]   Mon Jun 17, 2024 0030 Patient has been calm.  He has been out of restraints for over 2 hours.  Drug screen negative.  Likely spice but unsure if this is a psychiatric component.  Will send him to a psychiatric facility for eval [BS]      ED Course User Index  [BS] Steven Fierro MD       Medically cleared for psychiatry placement: 6/17/2024 12:29 AM                   Clinical Impression:  Final diagnoses:  [F23] Acute psychosis (Primary)          ED Disposition Condition    Transfer to Psych Facility Stable          ED Prescriptions    None       Follow-up Information    None          Steven Fierro MD  06/17/24 0031

## 2024-06-28 ENCOUNTER — HOSPITAL ENCOUNTER (EMERGENCY)
Facility: HOSPITAL | Age: 44
Discharge: LEFT BEFORE DISCHARGE | End: 2024-06-28
Attending: EMERGENCY MEDICINE
Payer: MEDICAID

## 2024-06-28 VITALS
TEMPERATURE: 98 F | OXYGEN SATURATION: 98 % | SYSTOLIC BLOOD PRESSURE: 142 MMHG | HEIGHT: 67 IN | WEIGHT: 210 LBS | BODY MASS INDEX: 32.96 KG/M2 | HEART RATE: 68 BPM | DIASTOLIC BLOOD PRESSURE: 92 MMHG | RESPIRATION RATE: 20 BRPM

## 2024-06-28 DIAGNOSIS — F19.10 DRUG ABUSE: Primary | ICD-10-CM

## 2024-06-28 PROCEDURE — 99283 EMERGENCY DEPT VISIT LOW MDM: CPT

## 2024-06-28 NOTE — ED PROVIDER NOTES
Encounter Date: 6/28/2024       History     Chief Complaint   Patient presents with    Drug Overdose     Pt brought in by [facility] with c/o hallucinations after smoking MOJO tonight.     Patient is a 43-year-old male with a history of substance abuse and bipolar disorder who presents to the emergency department via EMS with the police for reported belligerent behavior.  Police state they were called because patient was running around yelling [place] an [place] asking to take pictures with people asking for help.   here with the patient states that he is seen him before and he has usually a calm and cooperative person so they called EMS.  EMS states that he was wrestling and fighting with them when they attempted to place him on the stretcher.  Police the state the patient and give physically aggressive until the paramedics arrived.  Here in the emergency department patient states that he does not know why they brought him here.  He admits to smoking legal marijuana.  Patient has not to hospital wrist bands, he was here on 06/16 for a similar complaint but was much more aggressive and has a receive several sedating medications, and went to a psych facility on 06/17.    After patient was taken off the EMS stretcher and placed in the room he decided to walk out.  Patient was alert and oriented, 9 verbally aggressive to anyone in the ER at this time, patient was allowed to leave.        Review of patient's allergies indicates:  No Known Allergies  History reviewed. No pertinent past medical history.  History reviewed. No pertinent surgical history.  No family history on file.  Social History     Tobacco Use    Smoking status: Unknown   Substance Use Topics    Alcohol use: Yes    Drug use: Yes     Review of Systems   Constitutional:  Negative for fever.   HENT:  Negative for sore throat.    Respiratory:  Negative for shortness of breath.    Cardiovascular:  Negative for chest pain.   Gastrointestinal:  Negative for  nausea.   Genitourinary:  Negative for dysuria.   Musculoskeletal:  Negative for back pain.   Skin:  Negative for rash.   Neurological:  Negative for weakness.   Hematological:  Does not bruise/bleed easily.       Physical Exam     Initial Vitals [06/28/24 0215]   BP Pulse Resp Temp SpO2   (!) 142/92 68 20 98.4 °F (36.9 °C) 98 %      MAP       --         Physical Exam    Constitutional: He appears well-developed and well-nourished. No distress.   Patient loud, but cooperative, answering questions appropriately.  He does have on two hospital wrist bands, one from 6/16 on the right wrist and 6/17 on the left.  Patient with his cellphone and  in his lap on the EMS stretcher.   HENT:   Head: Normocephalic and atraumatic.   Nose: Nose normal.   Mouth/Throat: Oropharynx is clear and moist. No oropharyngeal exudate.   Eyes: Conjunctivae and EOM are normal. Right eye exhibits no discharge. Left eye exhibits no discharge. No scleral icterus.   Neck: Neck supple. No thyromegaly present. No tracheal deviation present.   Normal range of motion.  Cardiovascular:  Regular rhythm.           Pulmonary/Chest: Breath sounds normal. No stridor. He has no wheezes.   Abdominal: He exhibits no distension.   Musculoskeletal:         General: No edema. Normal range of motion.      Cervical back: Normal range of motion and neck supple.     Lymphadenopathy:     He has no cervical adenopathy.   Neurological: He is alert and oriented to person, place, and time. No cranial nerve deficit. GCS score is 15. GCS eye subscore is 4. GCS verbal subscore is 5. GCS motor subscore is 6.   Skin: Skin is warm and dry. Capillary refill takes less than 2 seconds. No rash noted.   Psychiatric:   Loud, cooperative         ED Course   Procedures  Labs Reviewed - No data to display       Imaging Results    None          Medications - No data to display  Medical Decision Making                                    Clinical Impression:  Final  diagnoses:  [F19.10] Drug abuse (Primary)          ED Disposition Condition    Maylin Cuevas MD  06/28/24 0240

## 2024-10-28 ENCOUNTER — HOSPITAL ENCOUNTER (EMERGENCY)
Facility: HOSPITAL | Age: 44
Discharge: HOME | End: 2024-10-28
Attending: INTERNAL MEDICINE
Payer: MEDICAID

## 2024-10-28 VITALS
RESPIRATION RATE: 10 BRPM | SYSTOLIC BLOOD PRESSURE: 107 MMHG | BODY MASS INDEX: 30.36 KG/M2 | HEART RATE: 80 BPM | OXYGEN SATURATION: 95 % | TEMPERATURE: 97 F | HEIGHT: 69 IN | WEIGHT: 205 LBS | DIASTOLIC BLOOD PRESSURE: 62 MMHG

## 2024-10-28 DIAGNOSIS — F19.921: Primary | ICD-10-CM

## 2024-10-28 DIAGNOSIS — Z00.8 MEDICAL CLEARANCE FOR PSYCHIATRIC ADMISSION: ICD-10-CM

## 2024-10-28 LAB
ALBUMIN SERPL-MCNC: 3.9 G/DL (ref 3.5–5)
ALBUMIN/GLOB SERPL: 1.2 RATIO (ref 1.1–2)
ALP SERPL-CCNC: 57 UNIT/L (ref 40–150)
ALT SERPL-CCNC: 13 UNIT/L (ref 0–55)
AMPHET UR QL SCN: NEGATIVE
ANION GAP SERPL CALC-SCNC: 9 MEQ/L
APAP SERPL-MCNC: <3 UG/ML (ref 10–30)
AST SERPL-CCNC: 16 UNIT/L (ref 5–34)
BACTERIA #/AREA URNS AUTO: ABNORMAL /HPF
BARBITURATE SCN PRESENT UR: NEGATIVE
BASOPHILS # BLD AUTO: 0.04 X10(3)/MCL
BASOPHILS NFR BLD AUTO: 0.6 %
BENZODIAZ UR QL SCN: NEGATIVE
BILIRUB SERPL-MCNC: 0.4 MG/DL
BILIRUB UR QL STRIP.AUTO: ABNORMAL
BUN SERPL-MCNC: 10 MG/DL (ref 8.9–20.6)
CALCIUM SERPL-MCNC: 8.8 MG/DL (ref 8.4–10.2)
CANNABINOIDS UR QL SCN: POSITIVE
CHLORIDE SERPL-SCNC: 108 MMOL/L (ref 98–107)
CLARITY UR: CLEAR
CO2 SERPL-SCNC: 25 MMOL/L (ref 22–29)
COCAINE UR QL SCN: NEGATIVE
COLOR UR AUTO: YELLOW
CREAT SERPL-MCNC: 1.11 MG/DL (ref 0.72–1.25)
CREAT/UREA NIT SERPL: 9
EOSINOPHIL # BLD AUTO: 0.13 X10(3)/MCL (ref 0–0.9)
EOSINOPHIL NFR BLD AUTO: 2 %
ERYTHROCYTE [DISTWIDTH] IN BLOOD BY AUTOMATED COUNT: 12.9 % (ref 11.5–17)
ETHANOL SERPL-MCNC: <10 MG/DL
FENTANYL UR QL SCN: NEGATIVE
GFR SERPLBLD CREATININE-BSD FMLA CKD-EPI: >60 ML/MIN/1.73/M2
GLOBULIN SER-MCNC: 3.2 GM/DL (ref 2.4–3.5)
GLUCOSE SERPL-MCNC: 114 MG/DL (ref 74–100)
GLUCOSE UR QL STRIP: NEGATIVE
HCT VFR BLD AUTO: 41.7 % (ref 42–52)
HGB BLD-MCNC: 14.8 G/DL (ref 14–18)
HGB UR QL STRIP: NEGATIVE
HYALINE CASTS URNS QL MICRO: ABNORMAL /LPF
IMM GRANULOCYTES # BLD AUTO: 0.01 X10(3)/MCL (ref 0–0.04)
IMM GRANULOCYTES NFR BLD AUTO: 0.2 %
KETONES UR QL STRIP: NEGATIVE
LEUKOCYTE ESTERASE UR QL STRIP: NEGATIVE
LYMPHOCYTES # BLD AUTO: 1.98 X10(3)/MCL (ref 0.6–4.6)
LYMPHOCYTES NFR BLD AUTO: 30.6 %
MCH RBC QN AUTO: 32.7 PG (ref 27–31)
MCHC RBC AUTO-ENTMCNC: 35.5 G/DL (ref 33–36)
MCV RBC AUTO: 92.1 FL (ref 80–94)
MDMA UR QL SCN: NEGATIVE
MONOCYTES # BLD AUTO: 0.37 X10(3)/MCL (ref 0.1–1.3)
MONOCYTES NFR BLD AUTO: 5.7 %
MUCOUS THREADS URNS QL MICRO: ABNORMAL /LPF
NEUTROPHILS # BLD AUTO: 3.94 X10(3)/MCL (ref 2.1–9.2)
NEUTROPHILS NFR BLD AUTO: 60.9 %
NITRITE UR QL STRIP: NEGATIVE
OPIATES UR QL SCN: NEGATIVE
PCP UR QL: NEGATIVE
PH UR STRIP: 5.5 [PH]
PH UR: 5.5 [PH] (ref 3–11)
PLATELET # BLD AUTO: 215 X10(3)/MCL (ref 130–400)
PMV BLD AUTO: 10.2 FL (ref 7.4–10.4)
POTASSIUM SERPL-SCNC: 3.9 MMOL/L (ref 3.5–5.1)
PROT SERPL-MCNC: 7.1 GM/DL (ref 6.4–8.3)
PROT UR QL STRIP: ABNORMAL
RBC # BLD AUTO: 4.53 X10(6)/MCL (ref 4.7–6.1)
RBC #/AREA URNS AUTO: ABNORMAL /HPF
SODIUM SERPL-SCNC: 142 MMOL/L (ref 136–145)
SP GR UR STRIP.AUTO: >=1.03 (ref 1–1.03)
SPECIFIC GRAVITY, URINE AUTO (.000) (OHS): >=1.03 (ref 1–1.03)
SQUAMOUS #/AREA URNS AUTO: ABNORMAL /HPF
TSH SERPL-ACNC: 0.96 UIU/ML (ref 0.35–4.94)
UROBILINOGEN UR STRIP-ACNC: 1
WBC # BLD AUTO: 6.47 X10(3)/MCL (ref 4.5–11.5)
WBC #/AREA URNS AUTO: ABNORMAL /HPF

## 2024-10-28 PROCEDURE — 85025 COMPLETE CBC W/AUTO DIFF WBC: CPT | Performed by: INTERNAL MEDICINE

## 2024-10-28 PROCEDURE — 93010 ELECTROCARDIOGRAM REPORT: CPT | Mod: ,,, | Performed by: INTERNAL MEDICINE

## 2024-10-28 PROCEDURE — 84443 ASSAY THYROID STIM HORMONE: CPT | Performed by: INTERNAL MEDICINE

## 2024-10-28 PROCEDURE — 80053 COMPREHEN METABOLIC PANEL: CPT | Performed by: INTERNAL MEDICINE

## 2024-10-28 PROCEDURE — 80307 DRUG TEST PRSMV CHEM ANLYZR: CPT | Performed by: INTERNAL MEDICINE

## 2024-10-28 PROCEDURE — 82077 ASSAY SPEC XCP UR&BREATH IA: CPT | Performed by: INTERNAL MEDICINE

## 2024-10-28 PROCEDURE — 80143 DRUG ASSAY ACETAMINOPHEN: CPT | Performed by: INTERNAL MEDICINE

## 2024-10-28 PROCEDURE — 99284 EMERGENCY DEPT VISIT MOD MDM: CPT | Mod: 25

## 2024-10-28 PROCEDURE — 81001 URINALYSIS AUTO W/SCOPE: CPT | Mod: XB | Performed by: INTERNAL MEDICINE

## 2024-10-28 PROCEDURE — 81003 URINALYSIS AUTO W/O SCOPE: CPT | Performed by: INTERNAL MEDICINE

## 2024-10-28 PROCEDURE — 93005 ELECTROCARDIOGRAM TRACING: CPT

## 2024-10-29 LAB
OHS QRS DURATION: 80 MS
OHS QTC CALCULATION: 405 MS

## 2024-11-18 ENCOUNTER — HOSPITAL ENCOUNTER (EMERGENCY)
Facility: HOSPITAL | Age: 44
Discharge: HOME | End: 2024-11-18
Attending: INTERNAL MEDICINE
Payer: MEDICAID

## 2024-11-18 VITALS
BODY MASS INDEX: 25.92 KG/M2 | HEART RATE: 105 BPM | HEIGHT: 69 IN | RESPIRATION RATE: 19 BRPM | DIASTOLIC BLOOD PRESSURE: 88 MMHG | OXYGEN SATURATION: 96 % | SYSTOLIC BLOOD PRESSURE: 138 MMHG | WEIGHT: 175 LBS | TEMPERATURE: 98 F

## 2024-11-18 DIAGNOSIS — F12.929 SYNTHETIC CANNABINOID INTOXICATION: Primary | ICD-10-CM

## 2024-11-18 PROCEDURE — 99283 EMERGENCY DEPT VISIT LOW MDM: CPT

## 2024-11-18 NOTE — ED PROVIDER NOTES
11/18/2024         3:38 PM    Source of History:  History obtained from patient and EMS.     Chief complaint:  From Nurse Triage:  Altered Mental Status (Brought per [facility] after being found face down in ditch by police, admits to smoking synthetic this am)    HISTORY OF PRESENT ILLNES:  Jairo Jha is a 43 y.o. male  has no past medical history on file. presenting with Altered Mental Status (Brought per [facility] after being found face down in ditch by police, admits to smoking synthetic this am) patient does not want to be here states that he went into a store to use the restroom and then the police came.  Patient is homeless at this time.      REVIEW OF SYSTEMS:   Constitutional symptoms:     Skin symptoms:      Eye symptoms:     ENMT symptoms:      Respiratory symptoms:      Cardiovascular symptoms:     Gastrointestinal symptoms:      Genitourinary symptoms:     Musculoskeletal symptoms:      Neurologic symptoms:      Psychiatric symptoms:               Additional review of systems information: Patient Denies Any Other Complaints.    All Other Systems Reviewed With Patient And Negative.    ALLEGIES:  Review of patient's allergies indicates:  No Known Allergies    MEDICINE LIST:  No current outpatient medications     PMH:  As per HPI and below:    Reviewed and updated in chart.    PAST MEDICAL HISTORY:  History reviewed. No pertinent past medical history.     PAST SURGICAL HISTORY:  History reviewed. No pertinent surgical history.    SOCIAL HISTORY:  Social History     Tobacco Use    Smoking status: Every Day     Types: Cigarettes    Smokeless tobacco: Never   Substance Use Topics    Alcohol use: Yes    Drug use: Yes       FAMILY HISTORY:  History reviewed. No pertinent family history.     PROBLEM LIST:  There is no problem list on file for this patient.       PHYSICAL EXAM:      ED Triage Vitals [11/18/24 1522]   BP    Pulse 106   Resp 20   Temp 97.8 °F (36.6 °C)   SpO2 95 %        Vital Signs: Reviewed As In  Chart.  General:  Alert, No Cardiorespiratory Distress Noted.  Skin: warm and dry  Eye:   Extraocular Movements Are Intact.   ENT: Mucus membranes are moist.   Cardiovascular:  Normal peripheral perfusion     Respiratory:  Normal respiratory rate    Gastrointestinal:  No distention    Neurological:  Alert And Oriented To Person, Place, Time, And Situation, Normal Motor Observed, Normal Speech Observed.  Musculoskeletal:  No Gross Deformity Noted.     Psychiatric:  Cooperative.  Denies suicidal ideation homicidal ideation or hallucinations      ED WORKUP FOR MEDICAL DECISION MAKING:    ED ORDERS:  No orders of the defined types were placed in this encounter.      ED MEDICINES:  Medications - No data to display             ED LABS ORDERED AND REVIEWED:  No visits with results within 1 Day(s) from this visit.   Latest known visit with results is:   Admission on 10/28/2024, Discharged on 10/28/2024   Component Date Value Ref Range Status    Sodium 10/28/2024 142  136 - 145 mmol/L Final    Potassium 10/28/2024 3.9  3.5 - 5.1 mmol/L Final    Chloride 10/28/2024 108 (H)  98 - 107 mmol/L Final    CO2 10/28/2024 25  22 - 29 mmol/L Final    Glucose 10/28/2024 114 (H)  74 - 100 mg/dL Final    Blood Urea Nitrogen 10/28/2024 10.0  8.9 - 20.6 mg/dL Final    Creatinine 10/28/2024 1.11  0.72 - 1.25 mg/dL Final    Calcium 10/28/2024 8.8  8.4 - 10.2 mg/dL Final    Protein Total 10/28/2024 7.1  6.4 - 8.3 gm/dL Final    Albumin 10/28/2024 3.9  3.5 - 5.0 g/dL Final    Globulin 10/28/2024 3.2  2.4 - 3.5 gm/dL Final    Albumin/Globulin Ratio 10/28/2024 1.2  1.1 - 2.0 ratio Final    Bilirubin Total 10/28/2024 0.4  <=1.5 mg/dL Final    ALP 10/28/2024 57  40 - 150 unit/L Final    ALT 10/28/2024 13  0 - 55 unit/L Final    AST 10/28/2024 16  5 - 34 unit/L Final    eGFR 10/28/2024 >60  mL/min/1.73/m2 Final    Anion Gap 10/28/2024 9.0  mEq/L Final    BUN/Creatinine Ratio 10/28/2024 9   Final    TSH 10/28/2024 0.959  0.350 - 4.940 uIU/mL Final     Color, UA 10/28/2024 Yellow  Yellow, Light-Yellow, Dark Yellow, Lillie, Straw Final    Appearance, UA 10/28/2024 Clear  Clear Final    Specific Gravity, UA 10/28/2024 >=1.030  1.005 - 1.030 Final    pH, UA 10/28/2024 5.5  5.0 - 8.5 Final    Protein, UA 10/28/2024 1+ (A)  Negative Final    Glucose, UA 10/28/2024 Negative  Negative, Normal Final    Ketones, UA 10/28/2024 Negative  Negative Final    Blood, UA 10/28/2024 Negative  Negative Final    Bilirubin, UA 10/28/2024 1+ (A)  Negative Final    Urobilinogen, UA 10/28/2024 1.0  0.2, 1.0, Normal Final    Nitrites, UA 10/28/2024 Negative  Negative Final    Leukocyte Esterase, UA 10/28/2024 Negative  Negative Final    Amphetamines, Urine 10/28/2024 Negative  Negative Final    Barbiturates, Urine 10/28/2024 Negative  Negative Final    Benzodiazepine, Urine 10/28/2024 Negative  Negative Final    Cannabinoids, Urine 10/28/2024 Positive (A)  Negative Final    Cocaine, Urine 10/28/2024 Negative  Negative Final    Fentanyl, Urine 10/28/2024 Negative  Negative Final    MDMA, Urine 10/28/2024 Negative  Negative Final    Opiates, Urine 10/28/2024 Negative  Negative Final    Phencyclidine, Urine 10/28/2024 Negative  Negative Final    pH, Urine 10/28/2024 5.5  3.0 - 11.0 Final    Specific Gravity, Urine Auto 10/28/2024 >=1.030  1.001 - 1.035 Final    Ethanol Level 10/28/2024 <10.0  <=10.0 mg/dL Final    Acetaminophen Level 10/28/2024 <3.0 (L)  10.0 - 30.0 ug/ml Final    QRS Duration 10/28/2024 80  ms Final    [facility] QTC Calculation 10/28/2024 405  ms Final    WBC 10/28/2024 6.47  4.50 - 11.50 x10(3)/mcL Final    RBC 10/28/2024 4.53 (L)  4.70 - 6.10 x10(6)/mcL Final    Hgb 10/28/2024 14.8  14.0 - 18.0 g/dL Final    Hct 10/28/2024 41.7 (L)  42.0 - 52.0 % Final    MCV 10/28/2024 92.1  80.0 - 94.0 fL Final    MCH 10/28/2024 32.7 (H)  27.0 - 31.0 pg Final    MCHC 10/28/2024 35.5  33.0 - 36.0 g/dL Final    RDW 10/28/2024 12.9  11.5 - 17.0 % Final    Platelet 10/28/2024 215  130 - 400  x10(3)/mcL Final    MPV 10/28/2024 10.2  7.4 - 10.4 fL Final    Neut % 10/28/2024 60.9  % Final    Lymph % 10/28/2024 30.6  % Final    Mono % 10/28/2024 5.7  % Final    Eos % 10/28/2024 2.0  % Final    Basophil % 10/28/2024 0.6  % Final    Lymph # 10/28/2024 1.98  0.6 - 4.6 x10(3)/mcL Final    Neut # 10/28/2024 3.94  2.1 - 9.2 x10(3)/mcL Final    Mono # 10/28/2024 0.37  0.1 - 1.3 x10(3)/mcL Final    Eos # 10/28/2024 0.13  0 - 0.9 x10(3)/mcL Final    Baso # 10/28/2024 0.04  <=0.2 x10(3)/mcL Final    IG# 10/28/2024 0.01  0 - 0.04 x10(3)/mcL Final    IG% 10/28/2024 0.2  % Final    Bacteria, UA 10/28/2024 None Seen  None Seen, Rare, Occasional /HPF Final    Hyaline Casts, UA 10/28/2024 Rare  None Seen, Rare /LPF Final    Mucous, UA 10/28/2024 Small (A)  None Seen /LPF Final    RBC, UA 10/28/2024 None Seen  None Seen, 0-2, 3-5, 0-5 /HPF Final    WBC, UA 10/28/2024 None Seen  None Seen, 0-2, 3-5, 0-5 /HPF Final    Squamous Epithelial Cells, UA 10/28/2024 Rare  None Seen, Rare, Occasional, Occ /HPF Final       RADIOLOGY STUDIES ORDERED AND REVIEWED:  Imaging Results    None         MEDICAL DECISION MAKING:    Jairo Jha is 43 y.o. male who  has no past medical history on file. arrives in ER with c/o Altered Mental Status (Brought per [facility] after being found face down in ditch by police, admits to smoking synthetic this am)      Reviewed Nurses Note. Reviewed Vital Signs.     Reviewed Pertinent old records, History and updated as necessary.    Vitals:    11/18/24 1522   Pulse: 106   Resp: 20   Temp: 97.8 °F (36.6 °C)        Medical Decision Making  Differential diagnosis includes but is not limited to confusion, dementia, CVA, TIA, hypoglycemia, UTI, pneumonia, alcohol intoxication, drug abuse, delirium, dehydration, electrolyte imbalance, depression, anxiety and intracranial hemorrhage.                        PROCEDURES PERFORMED IN ED:  Procedures    DIAGNOSTIC IMPRESSION:        ICD-10-CM ICD-9-CM   1. Synthetic  cannabinoid intoxication  F12.929 292.89     305.90         ED Disposition Condition    Discharge Stable               Medication List      You have not been prescribed any medications.           Follow-up Information       Primary care physician In 2 days.                              ED Prescriptions    None       Follow-up Information       Follow up With Specialties Details Why Contact Info    Primary care physician  In 2 days                 Jai Bartlett MD  11/18/24 1830